# Patient Record
Sex: MALE | Race: WHITE
[De-identification: names, ages, dates, MRNs, and addresses within clinical notes are randomized per-mention and may not be internally consistent; named-entity substitution may affect disease eponyms.]

---

## 2022-02-24 ENCOUNTER — HOSPITAL ENCOUNTER (OUTPATIENT)
Dept: HOSPITAL 95 - LAB SHORT | Age: 77
Discharge: HOME | End: 2022-02-24
Attending: PHYSICIAN ASSISTANT
Payer: MEDICARE

## 2022-02-24 DIAGNOSIS — L82.0: Primary | ICD-10-CM

## 2022-06-14 ENCOUNTER — HOSPITAL ENCOUNTER (OUTPATIENT)
Dept: HOSPITAL 95 - ER | Age: 77
Setting detail: OBSERVATION
LOS: 2 days | Discharge: HOME | End: 2022-06-16
Attending: INTERNAL MEDICINE | Admitting: HOSPITALIST
Payer: MEDICARE

## 2022-06-14 VITALS — HEIGHT: 67 IN | BODY MASS INDEX: 28.25 KG/M2 | WEIGHT: 180.01 LBS

## 2022-06-14 DIAGNOSIS — Z87.891: ICD-10-CM

## 2022-06-14 DIAGNOSIS — E87.6: ICD-10-CM

## 2022-06-14 DIAGNOSIS — Z88.8: ICD-10-CM

## 2022-06-14 DIAGNOSIS — I10: ICD-10-CM

## 2022-06-14 DIAGNOSIS — I25.10: ICD-10-CM

## 2022-06-14 DIAGNOSIS — R07.2: Primary | ICD-10-CM

## 2022-06-14 DIAGNOSIS — I49.3: ICD-10-CM

## 2022-06-14 DIAGNOSIS — Z95.1: ICD-10-CM

## 2022-06-14 DIAGNOSIS — I25.2: ICD-10-CM

## 2022-06-14 DIAGNOSIS — F41.1: ICD-10-CM

## 2022-06-14 LAB
ALBUMIN SERPL BCP-MCNC: 4.3 G/DL (ref 3.4–5)
ALBUMIN/GLOB SERPL: 1.3 {RATIO} (ref 0.8–1.8)
ALT SERPL W P-5'-P-CCNC: 27 U/L (ref 12–78)
ANION GAP SERPL CALCULATED.4IONS-SCNC: 8 MMOL/L (ref 6–16)
AST SERPL W P-5'-P-CCNC: 26 U/L (ref 12–37)
BASOPHILS # BLD AUTO: 0.06 K/MM3 (ref 0–0.23)
BASOPHILS NFR BLD AUTO: 1 % (ref 0–2)
BILIRUB SERPL-MCNC: 0.9 MG/DL (ref 0.1–1)
BUN SERPL-MCNC: 18 MG/DL (ref 8–24)
CALCIUM SERPL-MCNC: 9.5 MG/DL (ref 8.5–10.1)
CHLORIDE SERPL-SCNC: 107 MMOL/L (ref 98–108)
CO2 SERPL-SCNC: 26 MMOL/L (ref 21–32)
CREAT SERPL-MCNC: 0.94 MG/DL (ref 0.6–1.2)
DEPRECATED RDW RBC AUTO: 42.1 FL (ref 35.1–46.3)
EOSINOPHIL # BLD AUTO: 0.33 K/MM3 (ref 0–0.68)
EOSINOPHIL NFR BLD AUTO: 4 % (ref 0–6)
ERYTHROCYTE [DISTWIDTH] IN BLOOD BY AUTOMATED COUNT: 13 % (ref 11.7–14.2)
GLOBULIN SER CALC-MCNC: 3.2 G/DL (ref 2.2–4)
GLUCOSE SERPL-MCNC: 99 MG/DL (ref 70–99)
HCT VFR BLD AUTO: 41 % (ref 37–53)
HGB BLD-MCNC: 14 G/DL (ref 13.5–17.5)
IMM GRANULOCYTES # BLD AUTO: 0.03 K/MM3 (ref 0–0.1)
IMM GRANULOCYTES NFR BLD AUTO: 0 % (ref 0–1)
LYMPHOCYTES # BLD AUTO: 2.47 K/MM3 (ref 0.84–5.2)
LYMPHOCYTES NFR BLD AUTO: 30 % (ref 21–46)
MCHC RBC AUTO-ENTMCNC: 34.1 G/DL (ref 31.5–36.5)
MCV RBC AUTO: 90 FL (ref 80–100)
MONOCYTES # BLD AUTO: 0.87 K/MM3 (ref 0.16–1.47)
MONOCYTES NFR BLD AUTO: 11 % (ref 4–13)
NEUTROPHILS # BLD AUTO: 4.48 K/MM3 (ref 1.96–9.15)
NEUTROPHILS NFR BLD AUTO: 54 % (ref 41–73)
NRBC # BLD AUTO: 0 K/MM3 (ref 0–0.02)
NRBC BLD AUTO-RTO: 0 /100 WBC (ref 0–0.2)
PLATELET # BLD AUTO: 223 K/MM3 (ref 150–400)
POTASSIUM SERPL-SCNC: 3.3 MMOL/L (ref 3.5–5.5)
PROT SERPL-MCNC: 7.5 G/DL (ref 6.4–8.2)
SODIUM SERPL-SCNC: 141 MMOL/L (ref 136–145)

## 2022-06-14 PROCEDURE — G0378 HOSPITAL OBSERVATION PER HR: HCPCS

## 2022-06-14 PROCEDURE — A9270 NON-COVERED ITEM OR SERVICE: HCPCS

## 2022-06-14 PROCEDURE — A9500 TC99M SESTAMIBI: HCPCS

## 2022-06-15 LAB
ALBUMIN SERPL BCP-MCNC: 4.4 G/DL (ref 3.4–5)
ALBUMIN/GLOB SERPL: 1.3 {RATIO} (ref 0.8–1.8)
ALT SERPL W P-5'-P-CCNC: 27 U/L (ref 12–78)
ANION GAP SERPL CALCULATED.4IONS-SCNC: 5 MMOL/L (ref 6–16)
AST SERPL W P-5'-P-CCNC: 28 U/L (ref 12–37)
BASOPHILS # BLD AUTO: 0.06 K/MM3 (ref 0–0.23)
BASOPHILS NFR BLD AUTO: 1 % (ref 0–2)
BILIRUB SERPL-MCNC: 1.3 MG/DL (ref 0.1–1)
BUN SERPL-MCNC: 13 MG/DL (ref 8–24)
CALCIUM SERPL-MCNC: 9.7 MG/DL (ref 8.5–10.1)
CHLORIDE SERPL-SCNC: 107 MMOL/L (ref 98–108)
CK SERPL-CCNC: 179 U/L (ref 39–308)
CK SERPL-CCNC: 232 U/L (ref 39–308)
CO2 SERPL-SCNC: 29 MMOL/L (ref 21–32)
CREAT SERPL-MCNC: 0.9 MG/DL (ref 0.6–1.2)
DEPRECATED RDW RBC AUTO: 43.3 FL (ref 35.1–46.3)
EOSINOPHIL # BLD AUTO: 0.29 K/MM3 (ref 0–0.68)
EOSINOPHIL NFR BLD AUTO: 6 % (ref 0–6)
ERYTHROCYTE [DISTWIDTH] IN BLOOD BY AUTOMATED COUNT: 13 % (ref 11.7–14.2)
GLOBULIN SER CALC-MCNC: 3.4 G/DL (ref 2.2–4)
GLUCOSE SERPL-MCNC: 120 MG/DL (ref 70–99)
HCT VFR BLD AUTO: 45.4 % (ref 37–53)
HGB BLD-MCNC: 15 G/DL (ref 13.5–17.5)
IMM GRANULOCYTES # BLD AUTO: 0.02 K/MM3 (ref 0–0.1)
IMM GRANULOCYTES NFR BLD AUTO: 0 % (ref 0–1)
LYMPHOCYTES # BLD AUTO: 1.52 K/MM3 (ref 0.84–5.2)
LYMPHOCYTES NFR BLD AUTO: 29 % (ref 21–46)
MCHC RBC AUTO-ENTMCNC: 33 G/DL (ref 31.5–36.5)
MCV RBC AUTO: 92 FL (ref 80–100)
MONOCYTES # BLD AUTO: 0.37 K/MM3 (ref 0.16–1.47)
MONOCYTES NFR BLD AUTO: 7 % (ref 4–13)
NEUTROPHILS # BLD AUTO: 3.06 K/MM3 (ref 1.96–9.15)
NEUTROPHILS NFR BLD AUTO: 57 % (ref 41–73)
NRBC # BLD AUTO: 0 K/MM3 (ref 0–0.02)
NRBC BLD AUTO-RTO: 0 /100 WBC (ref 0–0.2)
PLATELET # BLD AUTO: 221 K/MM3 (ref 150–400)
POTASSIUM SERPL-SCNC: 4.1 MMOL/L (ref 3.5–5.5)
PROT SERPL-MCNC: 7.8 G/DL (ref 6.4–8.2)
SODIUM SERPL-SCNC: 141 MMOL/L (ref 136–145)

## 2022-06-15 NOTE — NUR
NURSE NOTE: LIONEL BOJORQUEZ CONTACTED TO CLARIFY APPROPRIATE TO ADMINISTER
SCHEDULED VASOTEC WITH PENDING STRESS TEST SCHEDULED IN THE MORNING. MD
APPROVED APPROPRIATE TO ADMINISTER THIS MEDICATION AT THIS TIME.

## 2022-06-15 NOTE — NUR
ADMISSION NOTE
PT ADMITTED FROM ED AT 0230. NS STARTED AT 75ML/HR. PT INDEPENDENT IN THE
ROOM. PLEASANT AND COOPERATIVE. PT BEGAN HAVING L SIDED CP AT 2130 LAST NIGHT.
PT HAS BEEN HAVING SOME RECENT STRESS. PT HAS ALSO HAD TWO PREVIOUS MI'S AND
HAD A CABG IN 2018. PT ON TELE, SINUS IN 60'S-70'S.

## 2022-06-16 NOTE — NUR
DC HOME
 
PT DC'D HOME. PIV DC'D WITH CATH TIP INTACT. NO REDNESS OR SWELLING AT SITE.
DC INSTRUCTIONS GIVEN TO PT. ALL QUESTIONS & CONCERNS ANSWERED. NEW
MEDICATIONS FAXED TO [facility].

## 2022-06-16 NOTE — NUR
SHIFT SUMMARY: PATIENT A/O X4, ADLIB IN ROOM, CALLS APPROPRIATELY. NO CHEST
PAIN THROUGHOUT THE NIGHT. NO CAFFEINE GIVEN AND NPO POST MIDNIGHT FOR PENDING
STRESS TEST.

## 2022-07-26 ENCOUNTER — HOSPITAL ENCOUNTER (EMERGENCY)
Dept: HOSPITAL 95 - ER | Age: 77
Discharge: HOME | End: 2022-07-26
Payer: MEDICARE

## 2022-07-26 VITALS — WEIGHT: 175 LBS | HEIGHT: 67 IN | BODY MASS INDEX: 27.47 KG/M2

## 2022-07-26 DIAGNOSIS — I25.2: ICD-10-CM

## 2022-07-26 DIAGNOSIS — Z88.8: ICD-10-CM

## 2022-07-26 DIAGNOSIS — Z95.1: ICD-10-CM

## 2022-07-26 DIAGNOSIS — Z79.899: ICD-10-CM

## 2022-07-26 DIAGNOSIS — E78.5: ICD-10-CM

## 2022-07-26 DIAGNOSIS — I25.10: ICD-10-CM

## 2022-07-26 DIAGNOSIS — I10: Primary | ICD-10-CM

## 2022-07-26 DIAGNOSIS — Z79.82: ICD-10-CM

## 2022-07-26 LAB
BASOPHILS # BLD AUTO: 0.08 K/MM3 (ref 0–0.23)
BASOPHILS NFR BLD AUTO: 1 % (ref 0–2)
DEPRECATED RDW RBC AUTO: 41.5 FL (ref 35.1–46.3)
EOSINOPHIL # BLD AUTO: 0.4 K/MM3 (ref 0–0.68)
EOSINOPHIL NFR BLD AUTO: 5 % (ref 0–6)
ERYTHROCYTE [DISTWIDTH] IN BLOOD BY AUTOMATED COUNT: 12.8 % (ref 11.7–14.2)
HCT VFR BLD AUTO: 43.4 % (ref 37–53)
HGB BLD-MCNC: 14.7 G/DL (ref 13.5–17.5)
IMM GRANULOCYTES # BLD AUTO: 0.03 K/MM3 (ref 0–0.1)
IMM GRANULOCYTES NFR BLD AUTO: 0 % (ref 0–1)
LYMPHOCYTES # BLD AUTO: 2.63 K/MM3 (ref 0.84–5.2)
LYMPHOCYTES NFR BLD AUTO: 30 % (ref 21–46)
MCHC RBC AUTO-ENTMCNC: 33.9 G/DL (ref 31.5–36.5)
MCV RBC AUTO: 88 FL (ref 80–100)
MONOCYTES # BLD AUTO: 0.7 K/MM3 (ref 0.16–1.47)
MONOCYTES NFR BLD AUTO: 8 % (ref 4–13)
NEUTROPHILS # BLD AUTO: 4.84 K/MM3 (ref 1.96–9.15)
NEUTROPHILS NFR BLD AUTO: 56 % (ref 41–73)
NRBC # BLD AUTO: 0 K/MM3 (ref 0–0.02)
NRBC BLD AUTO-RTO: 0 /100 WBC (ref 0–0.2)
PLATELET # BLD AUTO: 227 K/MM3 (ref 150–400)

## 2022-07-26 PROCEDURE — A9270 NON-COVERED ITEM OR SERVICE: HCPCS

## 2025-02-27 NOTE — NUR
PT TX TO PCU WITH MONTIOR IN PLACE. PT ALERT AND ORIENTED. R GROIN SITE SOFT,
NON TENDER. REPORT GIVEN TO ROC RANGEL, SITE REVIEWED.

## 2025-02-27 NOTE — NUR
arrival to pcu
 
patient arrived to pcu from cath lab with a right groin site that is soft
nontender, no bleeding, no hematoma, or swelling. patient had been recovered
for two hours at cath lab with only two hours remaining until patient can get
up. patient head of bed has been elevated and site remains soft nontender with
no bleeding or hematoma. patient vital signs stable and tele sinus rhythm 60s.
patient is alert and oriented x4. neuro is intact. perrla. patient uses a cane
or walker at baseline as needed and has this equipment at home. lung sounds
clear throughout. patient voids independently using a bedside urinal. see
shift assessment for further detials. plan remains up to date at this time

## 2025-02-27 NOTE — NUR
PT LAYING FLAT, FOLLOWING DIRECTIONS. PT GIVEN ICE CHIPS, REFUSING FOOD AT
THIS TIME. R GORIN SOFT, NON-TENDER, NO BLEEDING OR HEMATOMA NOTED. VSS.
WAITING FOR PCU BED.

## 2025-02-27 NOTE — NUR
PATIENT TO RECOVERY ROOM AT 1315 S/P CORONARY ANGIOGRAM. PT AWAKE AND ALERT,
DENIES C/O PAIN/CHEST PAIN. ANGIOSEAL TO RIGHT GROIN, WNL. SITE SOFT,
NONTENDER, WITHOUT SWELLING OR BLEEDING. +2 PULSES TO RIGHT DP/PT. PT SUPINE.
VSS.

## 2025-02-27 NOTE — NUR
shift summary
 
vital signs remains stable. right groin site remains unchanged, soft
nontender, no hematoma swelling or bleeding. no acute changes since arrival
see previous notes. plan remains up to date

## 2025-02-27 NOTE — NUR
TRANSFER SUMMARY:
PT AOX4 IND, STOOD UP AND TRANSFERRED SELF INTO BED. ABLE TO PROVIDE ACCURATE
INFORMATION AND TOLERATING MEDICATIONS WELL. ORIENTED TO ROOM, AND INSTRUCTED
TO CALL IF ANY CHANGES IN THEIR CP OR IF ANY OTHER CONDITIONS ARISE. PT
FOLLOWS COMMANDS AND ABLE TO MAKE NEEDS KNOWN. HEPARIN DRIP STARTED PER
PHARMACY. PT RESTING IN BED, BED IN LOWEST POSITION CALL LIGHT IN REACH.

## 2025-02-27 NOTE — NUR
SHIFT SUMMARY:
PT AOX4 PLEASANT MOOD AND AFFECT. CURRENTLY ON A HEPARIN DRIP. PT TOLERATING
MEDICATIONS WELL. SLEPT THROUGH THE NIGHT WITH NO COMPLAINTS. HAD A 6 RUN OF V
TACH, BUT OTHERWISE UN EVENTFUL NIGHT. PT IS IND IN THE ROOM AND CALLS
APPROPRIATELY. IS ABLE TO MAKE NEEDS KNOWN. PT IN BED SLEEPING, BED IN LOWEST
POSITION, CALL LIGHT IN REACH. CONTINUING CARE.

## 2025-02-27 NOTE — NUR
R GROIN SITE SOFT,NON TENDER. PT SAT UP AT 30 DEGREES. PT EATING SNACKS.
HEAT PAD TO R SHOULDER FOR CHRONIC PAIN CONTROL. DENIES OTHER NEEDS AT THIS
TIME.

## 2025-02-27 NOTE — NUR
Spiritual Care | Pt. Request
Pt. is in bed and welcomes my visit. Pt. verbalizes gratitude for the
spiritual care visit but also verbalizes that his dinner just arrived and he
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 [place] with interest and empathy. This
 asked the Pt. if he would like me to contact the [email or web address]. Care
Coordinator. Pt. declined but verbalized gratitude for the good work the
chaplains are doing.

## 2025-02-28 NOTE — NUR
discharge note
 
this rn assumed care at 0700. vital signs stable. tele sinus rhythm first
degree 80s.
 
patient is alert and oriented x4. neuro is intact. perrla. patient is
independent in the room. patient is able to make needs known and uses call
light appropriately. patient denies pain, chest pain/pressure or shortness of
breath. see shift assessment for further detials. right groin site is soft
nontender no hematoma present.
 
Md Saavedra in to see patient and discussed plans to go home today and
medications to take. This rn spoke with MD LOVE and updated MD Saavedra on
meds to continue at discharge. Discharge orders placed.
 
This rn went over discharge with patient and new medications, medications to
stop, and follow up appointments. patient verbalized understanding. patient
left with all belongings and in no distress.

## 2025-02-28 NOTE — NUR
PT STABLE THROUGHOUT THE SHIFT.  NO C/O CHEST PAIN OR DYSPNEA.  PT AOX4,
IND/SBA.  RT GROIN ANGIO SITE REMAINS CLEAN/DRY/INTACT W/O S/S
HEMATOMA/BLEEDING.  DISTAL CMS INTACT.